# Patient Record
(demographics unavailable — no encounter records)

---

## 2024-11-18 NOTE — DISCUSSION/SUMMARY
[de-identified] : This is a 49 y/o male 6 months s/p right reverse total shoulder replacement, and biceps tenodesis and now left shoulder pain.   Regarding the R shoulder:  Pain is well controlled, patient is doing well.  X-ray demonstrates components intact Patient is very happy with results Follow up in May 2025  Regarding the L shoulder:  MRI of the L shoulder was personally reviewed today and demonstrates a full thickness rotator cuff tear.  Maximum point of tenderness is LHBT region on exam today  Patient was treated today with US guided biceps injection for diagnostic and therapeutic purposes. Recommended use of Voltaren gel x2-3 daily over LHBT region Follow up as needed   (1) We discussed a comprehensive treatment plans that included a prescription management plan involving the use of prescription strength medications to include Ibuprofen 600-800 mg TID, versus 500-650 mg Tylenol. We also discussed prescribing topical diclofenac (Voltaren gel) as well as once daily Meloxicam 15 mg. (2) The patient has More Than One chronic injuries/illnesses as outlined, discussed, and documented by ICD 10 codes listed, as well as the HPI and Plan section. There is a moderate risk of morbidity with further treatment, especially from use of prescription strength medications and possible side effects of these medications which include upset stomach and cardiac/renal issues with long term use were discussed. (3) I recommended that the patient follow-up with their medical physician to discuss any significant specific potential issues with long term use such as interactions with current medications or with exacerbation of underlying medical morbidities.   Attestation: I, Rimma Garcia , attest that this documentation has been prepared under the direction and in the presence of Provider Loco Maharaj MD. The documentation recorded by the scribe, in my presence, accurately reflects the service I personally performed, and the decisions made by me with my edits as appropriate. Loco Maharaj MD

## 2024-11-18 NOTE — PHYSICAL EXAM
[Right] : right shoulder [Components well fixed, in good position] : Components well fixed, in good position [de-identified] : Constitutional: The general appearance of the patient is well developed, well nourished, no deformities and well groomed. Normal  Gait: Gait and function is as follows: normal gait.  Skin: Head and neck visualized skin is normal. Left upper extremity visualized skin is normal. Right upper extremity visualized skin is normal. Thoracic Skin of the thoracic spine shows visualized skin is normal.  Cardiovascular: palpable radial pulse bilaterally, good capillary refill in digits of the bilateral upper extremities and no temperature or color changes in the bilateral upper extremities.  Lymphatic: Normal Palpation of lymph nodes in the cervical.  Neurologic: fine motor control in the bilateral upper extremities is intact. Deep Tendon Reflexes in Upper and Lower Extremities Negative Maki's in the bilateral upper extremities. The patient is oriented to time, place and person. Sensation to light touch intact in the bilateral upper extremities. Mood and Affect is normal.  Right Shoulder:  Inspection of the shoulder/upper arm is as follows: Stable shoulder. Palpation of the shoulder/upper arm is as follows: There is mild diffuse tenderness . Range of motion of the shoulder is as follows: Limited secondary to immobilization/surgery. Strength of the shoulder is as follows:  Deltoid 5/5 Ligament Stability and Special Tests of the shoulder is as follows: Stable shoulder Incisions benign, no erythema/ swelling.  Left Shoulder: Inspection of the shoulder/upper arm is as follows: no scapula winging, no biceps deformity and no AC joint deformity. Palpation of the shoulder/upper arm is as follows: There is tenderness at the proximal biceps tendon. Range of motion of the shoulder is as follows: Pain with internal rotation, external rotation, abduction and forward flexion. Strength of the shoulder is as follows: Supraspinatus 4/5. External Rotation 4/5. Internal Rotation 4/5. Deltoid 5/5 Ligament Stability and Special Tests of the shoulder is as follows: Neer test is positive. Rico' test is positive. Speed's test is positive.  Neck:  Inspection / Palpation of the cervical spine is as follows: mild paracervical tenderness. Range of motion of the cervical spine is as follows: moderately decreased range of motion of the cervical spine. Stability testing for the cervical spine is as follows Stable range of motion.  Back, including spine: Inspection / Palpation of the thoracic/lumbar spine is as follows: There is a full, pain free, stable range of motion of the thoracic spine with a normal tone and not tenderness to palpation..

## 2024-11-18 NOTE — HISTORY OF PRESENT ILLNESS
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 Numbness and tingling in fingers  Worse with activity and better at rest. Has tried ice, activtiy mod  7/12/23: Patient presents from a referral from Dr. Jeffery for right shoulder pain from an injury on 6/29/23.  Patient referred for myotendinous junction tear with 3 cm retraction and proximal biceps tear.  Patient is a .  7/26/23: Patient returns today for repeat evaluation of right shoulder pain from a traumatic fall.  Unfortunately patient sustained full-thickness rotator cuff tear.  Continues to report significant pain and discomfort.  Wishes to discuss surgery. 8/18/23: Patient presents 8 days s/p right arthroscopic rotator cuff repair ( 1 medial 1 lateral with 3 DOD), SAD and biceps retrieval with tenodesis. Patient is doing well, pain is controlled.  Patient has been compliant with the brace. Denies any fever, chills, drainage.  9/8/23: Patient returns 4 weeks s/p right arthroscopic rotator cuff repair ( 1 medial 1 lateral with 3 DOD), SAD and biceps retrieval with tenodesis. Pt doing well and admits to mild pain after doing activities.  10/6/23: Patient presents today 2 months removed from s/p right arthroscopic rotator cuff repair ( 1 medial 1 lateral with 3 DOD), SAD and biceps retrieval with tenodesis.  Patient has been making steady progress with physical therapy.  Range of motion is improving.  He has been completing therapy once a week to preserve his visits.  11/6/23: Patient 3 months removed from s/p right arthroscopic rotator cuff repair ( 1 medial 1 lateral with 3 DOD), SAD and biceps retrieval with tenodesis.  Pt admits to mild cramping in the biceps tendon and being sore. Pt felt a pop in the biceps tendon.   11/27/23: Patient is nearly 4 months removed from s/p right arthroscopic rotator cuff repair ( 1 medial 1 lateral with 3 DOD), SAD and biceps retrieval with tenodesis. Pt noticed bruising and a lump on his shoulder after physical therapy.  12/18/23: Patient returns for follow up of right shoulder pain. He is currently 5 months removed from  right arthroscopic rotator cuff repair ( 1 medial 1 lateral with 3 DOD), SAD and biceps retrieval with tenodesis. Patient continues to note anterior deltoid swelling and pain. recent MRI demonstrates recurrent tear. 12/26/23: Patient presents 8 days s/p right arthroscopic revision rotator cuff repair with inspace balloon augmentation, open AC joint stabilization. Patient is doing well, pain is controlled.  Patient has been compliant with the brace. Denies any fever, chills, drainage.  1/29/24: Patient is 5 weeks s/p right arthroscopic revision rotator cuff repair with inspace balloon augmentation, open AC joint stabilization.  Patient has AC joint swelling.  No increased pain no erythema no drainage.   2/26/24: Patient here 2 months s/p right arthroscopic revision rotator cuff repair with inspace balloon augmentation, open AC joint stabilization.  Dynamic AC swelling, not consistent.  3/25/24: Patient here 3 months s/p right arthroscopic revision rotator cuff repair with inspace balloon augmentation, open AC joint stabilization. Pt continues to note swelling on the AC joint. Pt admits the pain has worsened over the last 4 weeks unfortuanatelyand reports pain and weakness with overhead lifting.  5/6/24: Patient returns today for follow-up of right shoulder pain.  Unfortunately underwent right revision rotator cuff repair with an limited open AC joint stabilization.  Patient continues to report diffuse swelling and pain.  At this point wishes to discuss reverse shoulder arthroplasty 5/20/24: Patient presents 6 days s/p right reverse total shoulder replacement and biceps tenodesis. Patient is doing well, pain is controlled.  Patient has been compliant with the brace. Denies any fever, chills, drainage. 6/17/24: Patient presents 5 weeks s/p right reverse total shoulder replacement and biceps tenodesis. Pt doing well and progressing as expected. Pt notes his left shoulder has become painful.  It is localized to the anterior aspect of the shoulder worse with going behind his back. 7/1/24: Patient presents 1.5 months s/p right reverse total shoulder replacement and biceps tenodesis. Patient admits a new onset of numbness that radiates from the elbow to the hand and fingers.  8/19/24: Patient presents today 3 months removed from right reverse total shoulder arthroplasty.  Reports he had slight increase in anterior deltoid discomfort 1 week ago with intermittent spasm.  Pain is since improved with ibuprofen. 11/18/24: Patient presents today 6 months removed from right reverse total shoulder arthroplasty. Patient reports the right shoulder is doing well. Patient reports anterior left shoulder pain. Patient reports his left shoulder pain is progressing.  DISCHARGE

## 2024-12-30 NOTE — DISCUSSION/SUMMARY
[de-identified] : This is a 49 y/o male 6 months s/p right reverse total shoulder replacement, and biceps tenodesis and now left shoulder pain.   Regarding the R shoulder:  Pain is well controlled, patient is doing well.  X-ray demonstrates components intact Patient is very happy with results Follow up in May 2025  Regarding the L shoulder:  MRI of the L shoulder was personally reviewed and demonstrates a full thickness rotator cuff tear.  Maximum point of tenderness is LHBT region on exam today  Based on the patients history and physical exam findings, patient has previously confirmed full-thickness rotator cuff tear, wish to get MRI to evaluate for worsening as well as evaluate suspected biceps rupture.  The patient has pain and subjective weakness consistent with this diagnosis.  Therefore, I recommend an MRI to evaluate for a rotator cuff tear.  This will also aid in evaluating for injury to the biceps labral complex and for any signs of impingement. The patient will follow-up after MRI to discuss further treatment options. follow up in 2 weeks   (1) We discussed a comprehensive treatment plans that included a prescription management plan involving the use of prescription strength medications to include Ibuprofen 600-800 mg TID, versus 500-650 mg Tylenol. We also discussed prescribing topical diclofenac (Voltaren gel) as well as once daily Meloxicam 15 mg. (2) The patient has More Than One chronic injuries/illnesses as outlined, discussed, and documented by ICD 10 codes listed, as well as the HPI and Plan section. There is a moderate risk of morbidity with further treatment, especially from use of prescription strength medications and possible side effects of these medications which include upset stomach and cardiac/renal issues with long term use were discussed. (3) I recommended that the patient follow-up with their medical physician to discuss any significant specific potential issues with long term use such as interactions with current medications or with exacerbation of underlying medical morbidities.   Attestation: I, Rimma Garcia , attest that this documentation has been prepared under the direction and in the presence of Provider Loco Maharaj MD. The documentation recorded by the scribe, in my presence, accurately reflects the service I personally performed, and the decisions made by me with my edits as appropriate. Loco Maharaj MD

## 2024-12-30 NOTE — PHYSICAL EXAM
[Right] : right shoulder [Components well fixed, in good position] : Components well fixed, in good position [de-identified] : Constitutional: The general appearance of the patient is well developed, well nourished, no deformities and well groomed. Normal  Gait: Gait and function is as follows: normal gait.  Skin: Head and neck visualized skin is normal. Left upper extremity visualized skin is normal. Right upper extremity visualized skin is normal. Thoracic Skin of the thoracic spine shows visualized skin is normal.  Cardiovascular: palpable radial pulse bilaterally, good capillary refill in digits of the bilateral upper extremities and no temperature or color changes in the bilateral upper extremities.  Lymphatic: Normal Palpation of lymph nodes in the cervical.  Neurologic: fine motor control in the bilateral upper extremities is intact. Deep Tendon Reflexes in Upper and Lower Extremities Negative Maki's in the bilateral upper extremities. The patient is oriented to time, place and person. Sensation to light touch intact in the bilateral upper extremities. Mood and Affect is normal.  Right Shoulder:  Inspection of the shoulder/upper arm is as follows: Stable shoulder. Palpation of the shoulder/upper arm is as follows: There is mild diffuse tenderness . Range of motion of the shoulder is as follows: Limited secondary to immobilization/surgery. Strength of the shoulder is as follows:  Deltoid 5/5 Ligament Stability and Special Tests of the shoulder is as follows: Stable shoulder Incisions benign, no erythema/ swelling.  Left Shoulder: Inspection of the shoulder/upper arm is as follows: no scapula winging, no biceps deformity and no AC joint deformity. Palpation of the shoulder/upper arm is as follows: There is tenderness at the proximal biceps tendon. Range of motion of the shoulder is as follows: Pain with internal rotation, external rotation, abduction and forward flexion. Strength of the shoulder is as follows: Supraspinatus 4/5. External Rotation 4/5. Internal Rotation 4/5. Deltoid 5/5 Ligament Stability and Special Tests of the shoulder is as follows: Neer test is positive. Rico' test is positive. Speed's test is positive.  Neck:  Inspection / Palpation of the cervical spine is as follows: mild paracervical tenderness. Range of motion of the cervical spine is as follows: moderately decreased range of motion of the cervical spine. Stability testing for the cervical spine is as follows Stable range of motion.  Back, including spine: Inspection / Palpation of the thoracic/lumbar spine is as follows: There is a full, pain free, stable range of motion of the thoracic spine with a normal tone and not tenderness to palpation..

## 2024-12-30 NOTE — HISTORY OF PRESENT ILLNESS
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 Numbness and tingling in fingers  Worse with activity and better at rest. Has tried ice, activtiy mod  7/12/23: Patient presents from a referral from Dr. Jeffery for right shoulder pain from an injury on 6/29/23.  Patient referred for myotendinous junction tear with 3 cm retraction and proximal biceps tear.  Patient is a .  7/26/23: Patient returns today for repeat evaluation of right shoulder pain from a traumatic fall.  Unfortunately patient sustained full-thickness rotator cuff tear.  Continues to report significant pain and discomfort.  Wishes to discuss surgery. 8/18/23: Patient presents 8 days s/p right arthroscopic rotator cuff repair ( 1 medial 1 lateral with 3 DOD), SAD and biceps retrieval with tenodesis. Patient is doing well, pain is controlled.  Patient has been compliant with the brace. Denies any fever, chills, drainage.  9/8/23: Patient returns 4 weeks s/p right arthroscopic rotator cuff repair ( 1 medial 1 lateral with 3 DOD), SAD and biceps retrieval with tenodesis. Pt doing well and admits to mild pain after doing activities.  10/6/23: Patient presents today 2 months removed from s/p right arthroscopic rotator cuff repair ( 1 medial 1 lateral with 3 DOD), SAD and biceps retrieval with tenodesis.  Patient has been making steady progress with physical therapy.  Range of motion is improving.  He has been completing therapy once a week to preserve his visits.  11/6/23: Patient 3 months removed from s/p right arthroscopic rotator cuff repair ( 1 medial 1 lateral with 3 DOD), SAD and biceps retrieval with tenodesis.  Pt admits to mild cramping in the biceps tendon and being sore. Pt felt a pop in the biceps tendon.   11/27/23: Patient is nearly 4 months removed from s/p right arthroscopic rotator cuff repair ( 1 medial 1 lateral with 3 DOD), SAD and biceps retrieval with tenodesis. Pt noticed bruising and a lump on his shoulder after physical therapy.  12/18/23: Patient returns for follow up of right shoulder pain. He is currently 5 months removed from  right arthroscopic rotator cuff repair ( 1 medial 1 lateral with 3 DOD), SAD and biceps retrieval with tenodesis. Patient continues to note anterior deltoid swelling and pain. recent MRI demonstrates recurrent tear. 12/26/23: Patient presents 8 days s/p right arthroscopic revision rotator cuff repair with inspace balloon augmentation, open AC joint stabilization. Patient is doing well, pain is controlled.  Patient has been compliant with the brace. Denies any fever, chills, drainage.  1/29/24: Patient is 5 weeks s/p right arthroscopic revision rotator cuff repair with inspace balloon augmentation, open AC joint stabilization.  Patient has AC joint swelling.  No increased pain no erythema no drainage.   2/26/24: Patient here 2 months s/p right arthroscopic revision rotator cuff repair with inspace balloon augmentation, open AC joint stabilization.  Dynamic AC swelling, not consistent.  3/25/24: Patient here 3 months s/p right arthroscopic revision rotator cuff repair with inspace balloon augmentation, open AC joint stabilization. Pt continues to note swelling on the AC joint. Pt admits the pain has worsened over the last 4 weeks unfortuanatelyand reports pain and weakness with overhead lifting.  5/6/24: Patient returns today for follow-up of right shoulder pain.  Unfortunately underwent right revision rotator cuff repair with an limited open AC joint stabilization.  Patient continues to report diffuse swelling and pain.  At this point wishes to discuss reverse shoulder arthroplasty 5/20/24: Patient presents 6 days s/p right reverse total shoulder replacement and biceps tenodesis. Patient is doing well, pain is controlled.  Patient has been compliant with the brace. Denies any fever, chills, drainage. 6/17/24: Patient presents 5 weeks s/p right reverse total shoulder replacement and biceps tenodesis. Pt doing well and progressing as expected. Pt notes his left shoulder has become painful.  It is localized to the anterior aspect of the shoulder worse with going behind his back. 7/1/24: Patient presents 1.5 months s/p right reverse total shoulder replacement and biceps tenodesis. Patient admits a new onset of numbness that radiates from the elbow to the hand and fingers.  8/19/24: Patient presents today 3 months removed from right reverse total shoulder arthroplasty.  Reports he had slight increase in anterior deltoid discomfort 1 week ago with intermittent spasm.  Pain is since improved with ibuprofen. 11/18/24: Patient presents today 6 months removed from right reverse total shoulder arthroplasty. Patient reports the right shoulder is doing well. Patient reports anterior left shoulder pain. Patient reports his left shoulder pain is progressing.   12/30/24 Patient presents today for follow up on the left shoulder. Patient reports that 2 days ago he was reaching for a heavy abject and felt a pop in the left upper extremity. Patient reports that he has been using compression sleeve and NSAIDs as necessary, with some relief of pain. Patient has notable deformity of biceps muscle, and reports pain with supination/pronation of the left extremity.

## 2024-12-30 NOTE — PHYSICAL EXAM
[Right] : right shoulder [Components well fixed, in good position] : Components well fixed, in good position [de-identified] : Constitutional: The general appearance of the patient is well developed, well nourished, no deformities and well groomed. Normal  Gait: Gait and function is as follows: normal gait.  Skin: Head and neck visualized skin is normal. Left upper extremity visualized skin is normal. Right upper extremity visualized skin is normal. Thoracic Skin of the thoracic spine shows visualized skin is normal.  Cardiovascular: palpable radial pulse bilaterally, good capillary refill in digits of the bilateral upper extremities and no temperature or color changes in the bilateral upper extremities.  Lymphatic: Normal Palpation of lymph nodes in the cervical.  Neurologic: fine motor control in the bilateral upper extremities is intact. Deep Tendon Reflexes in Upper and Lower Extremities Negative Maki's in the bilateral upper extremities. The patient is oriented to time, place and person. Sensation to light touch intact in the bilateral upper extremities. Mood and Affect is normal.  Right Shoulder:  Inspection of the shoulder/upper arm is as follows: Stable shoulder. Palpation of the shoulder/upper arm is as follows: There is mild diffuse tenderness . Range of motion of the shoulder is as follows: Limited secondary to immobilization/surgery. Strength of the shoulder is as follows:  Deltoid 5/5 Ligament Stability and Special Tests of the shoulder is as follows: Stable shoulder Incisions benign, no erythema/ swelling.  Left Shoulder: Inspection of the shoulder/upper arm is as follows: no scapula winging, no biceps deformity and no AC joint deformity. Palpation of the shoulder/upper arm is as follows: There is tenderness at the proximal biceps tendon. Range of motion of the shoulder is as follows: Pain with internal rotation, external rotation, abduction and forward flexion. Strength of the shoulder is as follows: Supraspinatus 4/5. External Rotation 4/5. Internal Rotation 4/5. Deltoid 5/5 Ligament Stability and Special Tests of the shoulder is as follows: Neer test is positive. Rico' test is positive. Speed's test is positive.  Neck:  Inspection / Palpation of the cervical spine is as follows: mild paracervical tenderness. Range of motion of the cervical spine is as follows: moderately decreased range of motion of the cervical spine. Stability testing for the cervical spine is as follows Stable range of motion.  Back, including spine: Inspection / Palpation of the thoracic/lumbar spine is as follows: There is a full, pain free, stable range of motion of the thoracic spine with a normal tone and not tenderness to palpation..

## 2024-12-30 NOTE — DISCUSSION/SUMMARY
[de-identified] : This is a 51 y/o male 6 months s/p right reverse total shoulder replacement, and biceps tenodesis and now left shoulder pain.   Regarding the R shoulder:  Pain is well controlled, patient is doing well.  X-ray demonstrates components intact Patient is very happy with results Follow up in May 2025  Regarding the L shoulder:  MRI of the L shoulder was personally reviewed and demonstrates a full thickness rotator cuff tear.  Maximum point of tenderness is LHBT region on exam today  Based on the patients history and physical exam findings, patient has previously confirmed full-thickness rotator cuff tear, wish to get MRI to evaluate for worsening as well as evaluate suspected biceps rupture.  The patient has pain and subjective weakness consistent with this diagnosis.  Therefore, I recommend an MRI to evaluate for a rotator cuff tear.  This will also aid in evaluating for injury to the biceps labral complex and for any signs of impingement. The patient will follow-up after MRI to discuss further treatment options. follow up in 2 weeks   (1) We discussed a comprehensive treatment plans that included a prescription management plan involving the use of prescription strength medications to include Ibuprofen 600-800 mg TID, versus 500-650 mg Tylenol. We also discussed prescribing topical diclofenac (Voltaren gel) as well as once daily Meloxicam 15 mg. (2) The patient has More Than One chronic injuries/illnesses as outlined, discussed, and documented by ICD 10 codes listed, as well as the HPI and Plan section. There is a moderate risk of morbidity with further treatment, especially from use of prescription strength medications and possible side effects of these medications which include upset stomach and cardiac/renal issues with long term use were discussed. (3) I recommended that the patient follow-up with their medical physician to discuss any significant specific potential issues with long term use such as interactions with current medications or with exacerbation of underlying medical morbidities.   Attestation: I, Rimma Garcia , attest that this documentation has been prepared under the direction and in the presence of Provider Loco Maharaj MD. The documentation recorded by the scribe, in my presence, accurately reflects the service I personally performed, and the decisions made by me with my edits as appropriate. Loco Maharaj MD

## 2025-01-13 NOTE — HISTORY OF PRESENT ILLNESS
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 Numbness and tingling in fingers  Worse with activity and better at rest. Has tried ice, activtiy mod  7/12/23: Patient presents from a referral from Dr. Jeffery for right shoulder pain from an injury on 6/29/23.  Patient referred for myotendinous junction tear with 3 cm retraction and proximal biceps tear.  Patient is a .  7/26/23: Patient returns today for repeat evaluation of right shoulder pain from a traumatic fall.  Unfortunately patient sustained full-thickness rotator cuff tear.  Continues to report significant pain and discomfort.  Wishes to discuss surgery. 8/18/23: Patient presents 8 days s/p right arthroscopic rotator cuff repair ( 1 medial 1 lateral with 3 DOD), SAD and biceps retrieval with tenodesis. Patient is doing well, pain is controlled.  Patient has been compliant with the brace. Denies any fever, chills, drainage.  9/8/23: Patient returns 4 weeks s/p right arthroscopic rotator cuff repair ( 1 medial 1 lateral with 3 DOD), SAD and biceps retrieval with tenodesis. Pt doing well and admits to mild pain after doing activities.  10/6/23: Patient presents today 2 months removed from s/p right arthroscopic rotator cuff repair ( 1 medial 1 lateral with 3 DOD), SAD and biceps retrieval with tenodesis.  Patient has been making steady progress with physical therapy.  Range of motion is improving.  He has been completing therapy once a week to preserve his visits.  11/6/23: Patient 3 months removed from s/p right arthroscopic rotator cuff repair ( 1 medial 1 lateral with 3 DOD), SAD and biceps retrieval with tenodesis.  Pt admits to mild cramping in the biceps tendon and being sore. Pt felt a pop in the biceps tendon.   11/27/23: Patient is nearly 4 months removed from s/p right arthroscopic rotator cuff repair ( 1 medial 1 lateral with 3 DOD), SAD and biceps retrieval with tenodesis. Pt noticed bruising and a lump on his shoulder after physical therapy.  12/18/23: Patient returns for follow up of right shoulder pain. He is currently 5 months removed from  right arthroscopic rotator cuff repair ( 1 medial 1 lateral with 3 DOD), SAD and biceps retrieval with tenodesis. Patient continues to note anterior deltoid swelling and pain. recent MRI demonstrates recurrent tear. 12/26/23: Patient presents 8 days s/p right arthroscopic revision rotator cuff repair with inspace balloon augmentation, open AC joint stabilization. Patient is doing well, pain is controlled.  Patient has been compliant with the brace. Denies any fever, chills, drainage.  1/29/24: Patient is 5 weeks s/p right arthroscopic revision rotator cuff repair with inspace balloon augmentation, open AC joint stabilization.  Patient has AC joint swelling.  No increased pain no erythema no drainage.   2/26/24: Patient here 2 months s/p right arthroscopic revision rotator cuff repair with inspace balloon augmentation, open AC joint stabilization.  Dynamic AC swelling, not consistent.  3/25/24: Patient here 3 months s/p right arthroscopic revision rotator cuff repair with inspace balloon augmentation, open AC joint stabilization. Pt continues to note swelling on the AC joint. Pt admits the pain has worsened over the last 4 weeks unfortuanatelyand reports pain and weakness with overhead lifting.  5/6/24: Patient returns today for follow-up of right shoulder pain.  Unfortunately underwent right revision rotator cuff repair with an limited open AC joint stabilization.  Patient continues to report diffuse swelling and pain.  At this point wishes to discuss reverse shoulder arthroplasty 5/20/24: Patient presents 6 days s/p right reverse total shoulder replacement and biceps tenodesis. Patient is doing well, pain is controlled.  Patient has been compliant with the brace. Denies any fever, chills, drainage. 6/17/24: Patient presents 5 weeks s/p right reverse total shoulder replacement and biceps tenodesis. Pt doing well and progressing as expected. Pt notes his left shoulder has become painful.  It is localized to the anterior aspect of the shoulder worse with going behind his back. 7/1/24: Patient presents 1.5 months s/p right reverse total shoulder replacement and biceps tenodesis. Patient admits a new onset of numbness that radiates from the elbow to the hand and fingers.  8/19/24: Patient presents today 3 months removed from right reverse total shoulder arthroplasty.  Reports he had slight increase in anterior deltoid discomfort 1 week ago with intermittent spasm.  Pain is since improved with ibuprofen. 11/18/24: Patient presents today 6 months removed from right reverse total shoulder arthroplasty. Patient reports the right shoulder is doing well. Patient reports anterior left shoulder pain. Patient reports his left shoulder pain is progressing.   12/30/24 Patient presents today for follow up on the left shoulder. Patient reports that 2 days ago he was reaching for a heavy abject and felt a pop in the left upper extremity. Patient reports that he has been using compression sleeve and NSAIDs as necessary, with some relief of pain. Patient has notable deformity of biceps muscle, and reports pain with supination/pronation of the left extremity.   1/13/25: Patient to discuss ongoing left shoulder pain.  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  Full-thickness rotator cuff tear cannot be excluded

## 2025-01-13 NOTE — PHYSICAL EXAM
[de-identified] : Constitutional: The general appearance of the patient is well developed, well nourished, no deformities and well groomed. Normal  Gait: Gait and function is as follows: normal gait.  Skin: Head and neck visualized skin is normal. Left upper extremity visualized skin is normal. Right upper extremity visualized skin is normal. Thoracic Skin of the thoracic spine shows visualized skin is normal.  Cardiovascular: palpable radial pulse bilaterally, good capillary refill in digits of the bilateral upper extremities and no temperature or color changes in the bilateral upper extremities.  Lymphatic: Normal Palpation of lymph nodes in the cervical.  Neurologic: fine motor control in the bilateral upper extremities is intact. Deep Tendon Reflexes in Upper and Lower Extremities Negative Maki's in the bilateral upper extremities. The patient is oriented to time, place and person. Sensation to light touch intact in the bilateral upper extremities. Mood and Affect is normal.  Right Shoulder:  Inspection of the shoulder/upper arm is as follows: Stable shoulder. Palpation of the shoulder/upper arm is as follows: There is mild diffuse tenderness . Range of motion of the shoulder is as follows: Limited secondary to immobilization/surgery. Strength of the shoulder is as follows:  Deltoid 5/5 Ligament Stability and Special Tests of the shoulder is as follows: Stable shoulder Incisions benign, no erythema/ swelling.  Left Shoulder: Inspection of the shoulder/upper arm is as follows: no scapula winging, no biceps deformity and no AC joint deformity. Palpation of the shoulder/upper arm is as follows: There is tenderness at the proximal biceps tendon. Range of motion of the shoulder is as follows: Pain with internal rotation, external rotation, abduction and forward flexion. Strength of the shoulder is as follows: Supraspinatus 4/5. External Rotation 4/5. Internal Rotation 4/5. Deltoid 5/5 Ligament Stability and Special Tests of the shoulder is as follows: Neer test is positive. Rico' test is positive. Speed's test is positive.  Neck:  Inspection / Palpation of the cervical spine is as follows: mild paracervical tenderness. Range of motion of the cervical spine is as follows: moderately decreased range of motion of the cervical spine. Stability testing for the cervical spine is as follows Stable range of motion.  Back, including spine: Inspection / Palpation of the thoracic/lumbar spine is as follows: There is a full, pain free, stable range of motion of the thoracic spine with a normal tone and not tenderness to palpation..

## 2025-02-14 NOTE — HISTORY OF PRESENT ILLNESS
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 Numbness and tingling in fingers  Worse with activity and better at rest. Has tried ice, activtiy mod  7/12/23: Patient presents from a referral from Dr. Jeffery for right shoulder pain from an injury on 6/29/23.  Patient referred for myotendinous junction tear with 3 cm retraction and proximal biceps tear.  Patient is a .  7/26/23: Patient returns today for repeat evaluation of right shoulder pain from a traumatic fall.  Unfortunately patient sustained full-thickness rotator cuff tear.  Continues to report significant pain and discomfort.  Wishes to discuss surgery. 8/18/23: Patient presents 8 days s/p right arthroscopic rotator cuff repair ( 1 medial 1 lateral with 3 DOD), SAD and biceps retrieval with tenodesis. Patient is doing well, pain is controlled.  Patient has been compliant with the brace. Denies any fever, chills, drainage.  9/8/23: Patient returns 4 weeks s/p right arthroscopic rotator cuff repair ( 1 medial 1 lateral with 3 DOD), SAD and biceps retrieval with tenodesis. Pt doing well and admits to mild pain after doing activities.  10/6/23: Patient presents today 2 months removed from s/p right arthroscopic rotator cuff repair ( 1 medial 1 lateral with 3 DOD), SAD and biceps retrieval with tenodesis.  Patient has been making steady progress with physical therapy.  Range of motion is improving.  He has been completing therapy once a week to preserve his visits.  11/6/23: Patient 3 months removed from s/p right arthroscopic rotator cuff repair ( 1 medial 1 lateral with 3 DOD), SAD and biceps retrieval with tenodesis.  Pt admits to mild cramping in the biceps tendon and being sore. Pt felt a pop in the biceps tendon.   11/27/23: Patient is nearly 4 months removed from s/p right arthroscopic rotator cuff repair ( 1 medial 1 lateral with 3 DOD), SAD and biceps retrieval with tenodesis. Pt noticed bruising and a lump on his shoulder after physical therapy.  12/18/23: Patient returns for follow up of right shoulder pain. He is currently 5 months removed from  right arthroscopic rotator cuff repair ( 1 medial 1 lateral with 3 DOD), SAD and biceps retrieval with tenodesis. Patient continues to note anterior deltoid swelling and pain. recent MRI demonstrates recurrent tear. 12/26/23: Patient presents 8 days s/p right arthroscopic revision rotator cuff repair with inspace balloon augmentation, open AC joint stabilization. Patient is doing well, pain is controlled.  Patient has been compliant with the brace. Denies any fever, chills, drainage.  1/29/24: Patient is 5 weeks s/p right arthroscopic revision rotator cuff repair with inspace balloon augmentation, open AC joint stabilization.  Patient has AC joint swelling.  No increased pain no erythema no drainage.   2/26/24: Patient here 2 months s/p right arthroscopic revision rotator cuff repair with inspace balloon augmentation, open AC joint stabilization.  Dynamic AC swelling, not consistent.  3/25/24: Patient here 3 months s/p right arthroscopic revision rotator cuff repair with inspace balloon augmentation, open AC joint stabilization. Pt continues to note swelling on the AC joint. Pt admits the pain has worsened over the last 4 weeks unfortuanatelyand reports pain and weakness with overhead lifting.  5/6/24: Patient returns today for follow-up of right shoulder pain.  Unfortunately underwent right revision rotator cuff repair with an limited open AC joint stabilization.  Patient continues to report diffuse swelling and pain.  At this point wishes to discuss reverse shoulder arthroplasty 5/20/24: Patient presents 6 days s/p right reverse total shoulder replacement and biceps tenodesis. Patient is doing well, pain is controlled.  Patient has been compliant with the brace. Denies any fever, chills, drainage. 6/17/24: Patient presents 5 weeks s/p right reverse total shoulder replacement and biceps tenodesis. Pt doing well and progressing as expected. Pt notes his left shoulder has become painful.  It is localized to the anterior aspect of the shoulder worse with going behind his back. 7/1/24: Patient presents 1.5 months s/p right reverse total shoulder replacement and biceps tenodesis. Patient admits a new onset of numbness that radiates from the elbow to the hand and fingers.  8/19/24: Patient presents today 3 months removed from right reverse total shoulder arthroplasty.  Reports he had slight increase in anterior deltoid discomfort 1 week ago with intermittent spasm.  Pain is since improved with ibuprofen. 11/18/24: Patient presents today 6 months removed from right reverse total shoulder arthroplasty. Patient reports the right shoulder is doing well. Patient reports anterior left shoulder pain. Patient reports his left shoulder pain is progressing.   12/30/24 Patient presents today for follow up on the left shoulder. Patient reports that 2 days ago he was reaching for a heavy abject and felt a pop in the left upper extremity. Patient reports that he has been using compression sleeve and NSAIDs as necessary, with some relief of pain. Patient has notable deformity of biceps muscle, and reports pain with supination/pronation of the left extremity.   1/13/25: Patient to discuss ongoing left shoulder pain.  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  Full-thickness rotator cuff tear cannot be excluded 2/14/25: Patient presents 10 days s/p left arthroscopic massive rotator cuff repair with DOD and regeneten augmentation, SAD and biceps retreival and tenodesis. Patient is doing well, pain is controlled.  Patient has been compliant with the brace. Denies any fever, chills, drainage.

## 2025-02-14 NOTE — DISCUSSION/SUMMARY
[de-identified] : This is a 52 yo male 10 days s/p left arthroscopic massive rotator cuff repair with DOD and regeneten augmentation, SAD and biceps retrieval and tenodesis patient is doing well, pain is controlled.   sutures removed today in the office. All incisions are healing well with no evidence of infection.  Patient was provided PT prescription according to large protocol. They will start in 4-5 weeks. Approx 3/18 Continue with sling immobilizer at this point Continue elbow,wrist,hand motion only  Patient will follow up in 1 month.

## 2025-02-14 NOTE — PHYSICAL EXAM
[de-identified] : Constitutional: The general appearance of the patient is well developed, well nourished, no deformities and well groomed. Normal  Gait: Gait and function is as follows: normal gait.  Skin: Head and neck visualized skin is normal. Left upper extremity visualized skin is normal. Right upper extremity visualized skin is normal. Thoracic Skin of the thoracic spine shows visualized skin is normal.  Cardiovascular: palpable radial pulse bilaterally, good capillary refill in digits of the bilateral upper extremities and no temperature or color changes in the bilateral upper extremities.  Lymphatic: Normal Palpation of lymph nodes in the cervical.  Neurologic: fine motor control in the bilateral upper extremities is intact. Deep Tendon Reflexes in Upper and Lower Extremities Negative Maki's in the bilateral upper extremities. The patient is oriented to time, place and person. Sensation to light touch intact in the bilateral upper extremities. Mood and Affect is normal.  Right Shoulder:  Inspection of the shoulder/upper arm is as follows: Stable shoulder. Palpation of the shoulder/upper arm is as follows: There is mild diffuse tenderness . Range of motion of the shoulder is as follows: Limited secondary to immobilization/surgery. Strength of the shoulder is as follows:  Deltoid 5/5 Ligament Stability and Special Tests of the shoulder is as follows: Stable shoulder Incisions benign, no erythema/ swelling.  Left Shoulder:  Inspection of the shoulder/upper arm is as follows: Stable shoulder. Palpation of the shoulder/upper arm is as follows: There is mild diffuse tenderness . Range of motion of the shoulder is as follows: Limited secondary to immobilization/surgery. Strength of the shoulder is as follows:  Deltoid 5/5 Ligament Stability and Special Tests of the shoulder is as follows: Stable shoulder Incisions benign, no erythema/ swelling. Neck:  Inspection / Palpation of the cervical spine is as follows: mild paracervical tenderness. Range of motion of the cervical spine is as follows: moderately decreased range of motion of the cervical spine. Stability testing for the cervical spine is as follows Stable range of motion.  Back, including spine: Inspection / Palpation of the thoracic/lumbar spine is as follows: There is a full, pain free, stable range of motion of the thoracic spine with a normal tone and not tenderness to palpation..

## 2025-03-10 NOTE — PHYSICAL EXAM
[de-identified] : Constitutional: The general appearance of the patient is well developed, well nourished, no deformities and well groomed. Normal  Gait: Gait and function is as follows: normal gait.  Skin: Head and neck visualized skin is normal. Left upper extremity visualized skin is normal. Right upper extremity visualized skin is normal. Thoracic Skin of the thoracic spine shows visualized skin is normal.  Cardiovascular: palpable radial pulse bilaterally, good capillary refill in digits of the bilateral upper extremities and no temperature or color changes in the bilateral upper extremities.  Lymphatic: Normal Palpation of lymph nodes in the cervical.  Neurologic: fine motor control in the bilateral upper extremities is intact. Deep Tendon Reflexes in Upper and Lower Extremities Negative Maki's in the bilateral upper extremities. The patient is oriented to time, place and person. Sensation to light touch intact in the bilateral upper extremities. Mood and Affect is normal.  Right Shoulder:  Inspection of the shoulder/upper arm is as follows: Stable shoulder. Palpation of the shoulder/upper arm is as follows: There is mild diffuse tenderness . Range of motion of the shoulder is as follows: Limited secondary to immobilization/surgery. Strength of the shoulder is as follows:  Deltoid 5/5 Ligament Stability and Special Tests of the shoulder is as follows: Stable shoulder Incisions benign, no erythema/ swelling.  Left Shoulder:  Inspection of the shoulder/upper arm is as follows: Stable shoulder. Palpation of the shoulder/upper arm is as follows: There is mild diffuse tenderness . Range of motion of the shoulder is as follows: Limited secondary to immobilization/surgery. Strength of the shoulder is as follows:  Deltoid 5/5 Ligament Stability and Special Tests of the shoulder is as follows: Stable shoulder Incisions benign, no erythema/ swelling. Neck:  Inspection / Palpation of the cervical spine is as follows: mild paracervical tenderness. Range of motion of the cervical spine is as follows: moderately decreased range of motion of the cervical spine. Stability testing for the cervical spine is as follows Stable range of motion.  Back, including spine: Inspection / Palpation of the thoracic/lumbar spine is as follows: There is a full, pain free, stable range of motion of the thoracic spine with a normal tone and not tenderness to palpation..

## 2025-03-10 NOTE — REASON FOR VISIT
[FreeTextEntry2] : Right Reverse Total shoulder 5/14/24 Left RCR 2/4/25 ([facility] and DOD)

## 2025-03-10 NOTE — HISTORY OF PRESENT ILLNESS
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 Numbness and tingling in fingers  Worse with activity and better at rest. Has tried ice, activtiy mod  7/12/23: Patient presents from a referral from Dr. Jeffery for right shoulder pain from an injury on 6/29/23.  Patient referred for myotendinous junction tear with 3 cm retraction and proximal biceps tear.  Patient is a .  7/26/23: Patient returns today for repeat evaluation of right shoulder pain from a traumatic fall.  Unfortunately patient sustained full-thickness rotator cuff tear.  Continues to report significant pain and discomfort.  Wishes to discuss surgery. 8/18/23: Patient presents 8 days s/p right arthroscopic rotator cuff repair ( 1 medial 1 lateral with 3 DOD), SAD and biceps retrieval with tenodesis. Patient is doing well, pain is controlled.  Patient has been compliant with the brace. Denies any fever, chills, drainage.  9/8/23: Patient returns 4 weeks s/p right arthroscopic rotator cuff repair ( 1 medial 1 lateral with 3 DOD), SAD and biceps retrieval with tenodesis. Pt doing well and admits to mild pain after doing activities.  10/6/23: Patient presents today 2 months removed from s/p right arthroscopic rotator cuff repair ( 1 medial 1 lateral with 3 DOD), SAD and biceps retrieval with tenodesis.  Patient has been making steady progress with physical therapy.  Range of motion is improving.  He has been completing therapy once a week to preserve his visits.  11/6/23: Patient 3 months removed from s/p right arthroscopic rotator cuff repair ( 1 medial 1 lateral with 3 DOD), SAD and biceps retrieval with tenodesis.  Pt admits to mild cramping in the biceps tendon and being sore. Pt felt a pop in the biceps tendon.   11/27/23: Patient is nearly 4 months removed from s/p right arthroscopic rotator cuff repair ( 1 medial 1 lateral with 3 DOD), SAD and biceps retrieval with tenodesis. Pt noticed bruising and a lump on his shoulder after physical therapy.  12/18/23: Patient returns for follow up of right shoulder pain. He is currently 5 months removed from  right arthroscopic rotator cuff repair ( 1 medial 1 lateral with 3 DOD), SAD and biceps retrieval with tenodesis. Patient continues to note anterior deltoid swelling and pain. recent MRI demonstrates recurrent tear. 12/26/23: Patient presents 8 days s/p right arthroscopic revision rotator cuff repair with inspace balloon augmentation, open AC joint stabilization. Patient is doing well, pain is controlled.  Patient has been compliant with the brace. Denies any fever, chills, drainage.  1/29/24: Patient is 5 weeks s/p right arthroscopic revision rotator cuff repair with inspace balloon augmentation, open AC joint stabilization.  Patient has AC joint swelling.  No increased pain no erythema no drainage.   2/26/24: Patient here 2 months s/p right arthroscopic revision rotator cuff repair with inspace balloon augmentation, open AC joint stabilization.  Dynamic AC swelling, not consistent.  3/25/24: Patient here 3 months s/p right arthroscopic revision rotator cuff repair with inspace balloon augmentation, open AC joint stabilization. Pt continues to note swelling on the AC joint. Pt admits the pain has worsened over the last 4 weeks unfortuanatelyand reports pain and weakness with overhead lifting.  5/6/24: Patient returns today for follow-up of right shoulder pain.  Unfortunately underwent right revision rotator cuff repair with an limited open AC joint stabilization.  Patient continues to report diffuse swelling and pain.  At this point wishes to discuss reverse shoulder arthroplasty 5/20/24: Patient presents 6 days s/p right reverse total shoulder replacement and biceps tenodesis. Patient is doing well, pain is controlled.  Patient has been compliant with the brace. Denies any fever, chills, drainage. 6/17/24: Patient presents 5 weeks s/p right reverse total shoulder replacement and biceps tenodesis. Pt doing well and progressing as expected. Pt notes his left shoulder has become painful.  It is localized to the anterior aspect of the shoulder worse with going behind his back. 7/1/24: Patient presents 1.5 months s/p right reverse total shoulder replacement and biceps tenodesis. Patient admits a new onset of numbness that radiates from the elbow to the hand and fingers.  8/19/24: Patient presents today 3 months removed from right reverse total shoulder arthroplasty.  Reports he had slight increase in anterior deltoid discomfort 1 week ago with intermittent spasm.  Pain is since improved with ibuprofen. 11/18/24: Patient presents today 6 months removed from right reverse total shoulder arthroplasty. Patient reports the right shoulder is doing well. Patient reports anterior left shoulder pain. Patient reports his left shoulder pain is progressing.   12/30/24 Patient presents today for follow up on the left shoulder. Patient reports that 2 days ago he was reaching for a heavy abject and felt a pop in the left upper extremity. Patient reports that he has been using compression sleeve and NSAIDs as necessary, with some relief of pain. Patient has notable deformity of biceps muscle, and reports pain with supination/pronation of the left extremity.   1/13/25: Patient to discuss ongoing left shoulder pain.  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  Full-thickness rotator cuff tear cannot be excluded 2/14/25: Patient presents 10 days s/p left arthroscopic massive rotator cuff repair with DOD and regeneten augmentation, SAD and biceps retreival and tenodesis. Patient is doing well, pain is controlled.  Patient has been compliant with the brace. Denies any fever, chills, drainage.  3/10/25: Pt presents today s/p left RCR 2/4/25. Patient is still in compliance with sling protocol. Patient is progressing with his healing

## 2025-03-10 NOTE — DISCUSSION/SUMMARY
[de-identified] : This is a 52 yo male 5 weeks s/p left arthroscopic massive rotator cuff repair with DOD and regeneten augmentation, SAD and biceps retrieval and tenodesis  Patient is doing well, pain is controlled.   Patient was provided PT prescription according to large protocol. They will start in1 week. Approx 3/18 Continue with sling immobilizer at this point Continue elbow, wrist, hand motion only  Patient will follow up in 6 weeks.  (1) We discussed a comprehensive treatment plans that included a prescription management plan involving the use of prescription strength medications to include Ibuprofen 600-800 mg TID, versus 500-650 mg Tylenol. We also discussed prescribing topical diclofenac (Voltaren gel) as well as once daily MeloFxicam 15 mg. (2) The patient has More Than One chronic injuries/illnesses as outlined, discussed, and documented by ICD 10 codes listed, as well as the HPI and Plan section. There is a moderate risk of morbidity with further treatment, especially from use of prescription strength medications and possible side effects of these medications which include upset stomach and cardiac/renal issues with long term use were discussed. (3) I recommended that the patient follow-up with their medical physician to discuss any significant specific potential issues with long term use such as interactions with current medications or with exacerbation of underlying medical morbidities.    Attestation:   I, Jacqueline Whitaker, attest that this documentation has been prepared under the direction and in the presence of Provider Loco Maharaj MD.  The documentation recorded by the scribe, in my presence, accurately reflects the service I personally performed, and the decisions made by me with my edits as appropriate. Loco Maharaj MD

## 2025-04-21 NOTE — REASON FOR VISIT
[FreeTextEntry2] : Right Reverse Total shoulder 5/14/24 Left RCR 2/4/25 ([facility] and DOD)

## 2025-04-21 NOTE — HISTORY OF PRESENT ILLNESS
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 Numbness and tingling in fingers  Worse with activity and better at rest. Has tried ice, activtiy mod  7/12/23: Patient presents from a referral from Dr. Jeffery for right shoulder pain from an injury on 6/29/23.  Patient referred for myotendinous junction tear with 3 cm retraction and proximal biceps tear.  Patient is a .  7/26/23: Patient returns today for repeat evaluation of right shoulder pain from a traumatic fall.  Unfortunately patient sustained full-thickness rotator cuff tear.  Continues to report significant pain and discomfort.  Wishes to discuss surgery. 8/18/23: Patient presents 8 days s/p right arthroscopic rotator cuff repair ( 1 medial 1 lateral with 3 DOD), SAD and biceps retrieval with tenodesis. Patient is doing well, pain is controlled.  Patient has been compliant with the brace. Denies any fever, chills, drainage.  9/8/23: Patient returns 4 weeks s/p right arthroscopic rotator cuff repair ( 1 medial 1 lateral with 3 DOD), SAD and biceps retrieval with tenodesis. Pt doing well and admits to mild pain after doing activities.  10/6/23: Patient presents today 2 months removed from s/p right arthroscopic rotator cuff repair ( 1 medial 1 lateral with 3 DOD), SAD and biceps retrieval with tenodesis.  Patient has been making steady progress with physical therapy.  Range of motion is improving.  He has been completing therapy once a week to preserve his visits.  11/6/23: Patient 3 months removed from s/p right arthroscopic rotator cuff repair ( 1 medial 1 lateral with 3 DOD), SAD and biceps retrieval with tenodesis.  Pt admits to mild cramping in the biceps tendon and being sore. Pt felt a pop in the biceps tendon.   11/27/23: Patient is nearly 4 months removed from s/p right arthroscopic rotator cuff repair ( 1 medial 1 lateral with 3 DOD), SAD and biceps retrieval with tenodesis. Pt noticed bruising and a lump on his shoulder after physical therapy.  12/18/23: Patient returns for follow up of right shoulder pain. He is currently 5 months removed from  right arthroscopic rotator cuff repair ( 1 medial 1 lateral with 3 DOD), SAD and biceps retrieval with tenodesis. Patient continues to note anterior deltoid swelling and pain. recent MRI demonstrates recurrent tear. 12/26/23: Patient presents 8 days s/p right arthroscopic revision rotator cuff repair with inspace balloon augmentation, open AC joint stabilization. Patient is doing well, pain is controlled.  Patient has been compliant with the brace. Denies any fever, chills, drainage.  1/29/24: Patient is 5 weeks s/p right arthroscopic revision rotator cuff repair with inspace balloon augmentation, open AC joint stabilization.  Patient has AC joint swelling.  No increased pain no erythema no drainage.   2/26/24: Patient here 2 months s/p right arthroscopic revision rotator cuff repair with inspace balloon augmentation, open AC joint stabilization.  Dynamic AC swelling, not consistent.  3/25/24: Patient here 3 months s/p right arthroscopic revision rotator cuff repair with inspace balloon augmentation, open AC joint stabilization. Pt continues to note swelling on the AC joint. Pt admits the pain has worsened over the last 4 weeks unfortuanatelyand reports pain and weakness with overhead lifting.  5/6/24: Patient returns today for follow-up of right shoulder pain.  Unfortunately underwent right revision rotator cuff repair with an limited open AC joint stabilization.  Patient continues to report diffuse swelling and pain.  At this point wishes to discuss reverse shoulder arthroplasty 5/20/24: Patient presents 6 days s/p right reverse total shoulder replacement and biceps tenodesis. Patient is doing well, pain is controlled.  Patient has been compliant with the brace. Denies any fever, chills, drainage. 6/17/24: Patient presents 5 weeks s/p right reverse total shoulder replacement and biceps tenodesis. Pt doing well and progressing as expected. Pt notes his left shoulder has become painful.  It is localized to the anterior aspect of the shoulder worse with going behind his back. 7/1/24: Patient presents 1.5 months s/p right reverse total shoulder replacement and biceps tenodesis. Patient admits a new onset of numbness that radiates from the elbow to the hand and fingers.  8/19/24: Patient presents today 3 months removed from right reverse total shoulder arthroplasty.  Reports he had slight increase in anterior deltoid discomfort 1 week ago with intermittent spasm.  Pain is since improved with ibuprofen. 11/18/24: Patient presents today 6 months removed from right reverse total shoulder arthroplasty. Patient reports the right shoulder is doing well. Patient reports anterior left shoulder pain. Patient reports his left shoulder pain is progressing.   12/30/24 Patient presents today for follow up on the left shoulder. Patient reports that 2 days ago he was reaching for a heavy abject and felt a pop in the left upper extremity. Patient reports that he has been using compression sleeve and NSAIDs as necessary, with some relief of pain. Patient has notable deformity of biceps muscle, and reports pain with supination/pronation of the left extremity.   1/13/25: Patient to discuss ongoing left shoulder pain.  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  Full-thickness rotator cuff tear cannot be excluded 2/14/25: Patient presents 10 days s/p left arthroscopic massive rotator cuff repair with DOD and regeneten augmentation, SAD and biceps retreival and tenodesis. Patient is doing well, pain is controlled.  Patient has been compliant with the brace. Denies any fever, chills, drainage.  3/10/25: Pt presents today s/p left RCR 2/4/25. Patient is still in compliance with sling protocol. Patient is progressing with his healing   4/21/25: Pt presents today s/p left RCR 2/4/25. Pt reports slowly progressing with his healing and they are concerned with muscle loss.  He is having some discomfort but has achieved full range of motion

## 2025-04-21 NOTE — DISCUSSION/SUMMARY
[de-identified] : This is a 52 yo male 10 weeks s/p left arthroscopic massive rotator cuff repair with DOD and regeneten augmentation, SAD and biceps retrieval and tenodesis  Patient is approximately 10 weeks removed and due to the complex nature of the tear, objectively his exam is consistent with normal healing at this point. We did discuss with the patient that in the past he has had difficulty healing tendon repairs so there is a chance that this may once again to happen on this side. I did recommend that he stop physical therapy since he has full passive and active assist range of motion and therefore he should be able to strengthen it with the supine forward flexion and proceeding with activities as tolerated  over the next 6 to 8 weeks.  If patient is not making progress he will notify the office and we will get an MRI to check on the status of the repair.  (1) We discussed a comprehensive treatment plans that included a prescription management plan involving the use of prescription strength medications to include Ibuprofen 600-800 mg TID, versus 500-650 mg Tylenol. We also discussed prescribing topical diclofenac (Voltaren gel) as well as once daily MeloFxicam 15 mg. (2) The patient has More Than One chronic injuries/illnesses as outlined, discussed, and documented by ICD 10 codes listed, as well as the HPI and Plan section. There is a moderate risk of morbidity with further treatment, especially from use of prescription strength medications and possible side effects of these medications which include upset stomach and cardiac/renal issues with long term use were discussed. (3) I recommended that the patient follow-up with their medical physician to discuss any significant specific potential issues with long term use such as interactions with current medications or with exacerbation of underlying medical morbidities.    Attestation:   I, Jacqueline Whitaker, attest that this documentation has been prepared under the direction and in the presence of Provider Loco Maharaj MD.  The documentation recorded by the scribe, in my presence, accurately reflects the service I personally performed, and the decisions made by me with my edits as appropriate. Loco Maharaj MD

## 2025-04-21 NOTE — PHYSICAL EXAM
[de-identified] : Constitutional: The general appearance of the patient is well developed, well nourished, no deformities and well groomed. Normal  Gait: Gait and function is as follows: normal gait.  Skin: Head and neck visualized skin is normal. Left upper extremity visualized skin is normal. Right upper extremity visualized skin is normal. Thoracic Skin of the thoracic spine shows visualized skin is normal.  Cardiovascular: palpable radial pulse bilaterally, good capillary refill in digits of the bilateral upper extremities and no temperature or color changes in the bilateral upper extremities.  Lymphatic: Normal Palpation of lymph nodes in the cervical.  Neurologic: fine motor control in the bilateral upper extremities is intact. Deep Tendon Reflexes in Upper and Lower Extremities Negative Maki's in the bilateral upper extremities. The patient is oriented to time, place and person. Sensation to light touch intact in the bilateral upper extremities. Mood and Affect is normal.  Right Shoulder:  Inspection of the shoulder/upper arm is as follows: Stable shoulder. Palpation of the shoulder/upper arm is as follows: There is mild diffuse tenderness . Range of motion of the shoulder is as follows: Limited secondary to immobilization/surgery. Strength of the shoulder is as follows:  Deltoid 5/5 Ligament Stability and Special Tests of the shoulder is as follows: Stable shoulder Incisions benign, no erythema/ swelling.  Left Shoulder:  Inspection of the shoulder/upper arm is as follows: Stable shoulder. Palpation of the shoulder/upper arm is as follows: There is mild diffuse tenderness . Range of motion of the shoulder is as follows: Limited secondary to immobilization/surgery. Strength of the shoulder is as follows:  Deltoid 5/5 Ligament Stability and Special Tests of the shoulder is as follows: Stable shoulder Incisions benign, no erythema/ swelling. Neck:  Inspection / Palpation of the cervical spine is as follows: mild paracervical tenderness. Range of motion of the cervical spine is as follows: moderately decreased range of motion of the cervical spine. Stability testing for the cervical spine is as follows Stable range of motion.  Back, including spine: Inspection / Palpation of the thoracic/lumbar spine is as follows: There is a full, pain free, stable range of motion of the thoracic spine with a normal tone and not tenderness to palpation..